# Patient Record
Sex: MALE | Race: WHITE | NOT HISPANIC OR LATINO | URBAN - METROPOLITAN AREA
[De-identification: names, ages, dates, MRNs, and addresses within clinical notes are randomized per-mention and may not be internally consistent; named-entity substitution may affect disease eponyms.]

---

## 2018-04-04 RX ORDER — ALBUTEROL SULFATE 90 UG/1
2 INHALANT RESPIRATORY (INHALATION) EVERY 6 HOURS PRN
Qty: 1 INHALER | Refills: 1 | Status: SHIPPED | OUTPATIENT
Start: 2018-04-04 | End: 2018-05-04 | Stop reason: HOSPADM

## 2018-04-19 ENCOUNTER — OFFICE VISIT (OUTPATIENT)
Dept: INTERNAL MEDICINE | Facility: CLINIC | Age: 58
End: 2018-04-19
Payer: COMMERCIAL

## 2018-04-19 VITALS
HEART RATE: 107 BPM | HEIGHT: 70 IN | WEIGHT: 250 LBS | RESPIRATION RATE: 20 BRPM | BODY MASS INDEX: 35.79 KG/M2 | OXYGEN SATURATION: 98 % | DIASTOLIC BLOOD PRESSURE: 72 MMHG | SYSTOLIC BLOOD PRESSURE: 110 MMHG | TEMPERATURE: 98.2 F

## 2018-04-19 DIAGNOSIS — Z01.818 PRE-OPERATIVE CLEARANCE: ICD-10-CM

## 2018-04-19 DIAGNOSIS — Z12.9 CANCER SCREENING: ICD-10-CM

## 2018-04-19 DIAGNOSIS — Z01.818 PREOPERATIVE CLEARANCE: Primary | ICD-10-CM

## 2018-04-19 DIAGNOSIS — Z01.810 PRE-OPERATIVE CARDIOVASCULAR EXAMINATION, HIGH RISK SURGERY: ICD-10-CM

## 2018-04-19 DIAGNOSIS — I10 ESSENTIAL HYPERTENSION: ICD-10-CM

## 2018-04-19 PROCEDURE — 99214 OFFICE O/P EST MOD 30 MIN: CPT | Performed by: INTERNAL MEDICINE

## 2018-04-19 RX ORDER — TRIAMTERENE/HYDROCHLOROTHIAZID 37.5-25 MG
TABLET ORAL
COMMUNITY
Start: 2018-01-04 | End: 2019-06-25 | Stop reason: SDUPTHER

## 2018-04-19 RX ORDER — OXYCODONE AND ACETAMINOPHEN 5; 325 MG/1; MG/1
1 TABLET ORAL EVERY 4 HOURS PRN
Qty: 20 TABLET | Refills: 0 | Status: SHIPPED | OUTPATIENT
Start: 2018-04-19 | End: 2018-04-29

## 2018-04-19 RX ORDER — ALBUTEROL SULFATE 90 UG/1
INHALANT RESPIRATORY (INHALATION)
COMMUNITY
Start: 2017-12-27 | End: 2019-01-17 | Stop reason: SDUPTHER

## 2018-04-19 RX ORDER — LISINOPRIL 20 MG/1
20 TABLET ORAL
COMMUNITY
Start: 2018-01-04 | End: 2018-12-16 | Stop reason: SDUPTHER

## 2018-04-19 NOTE — PROGRESS NOTES
Patient ID: Christian Stevenson is a 57 y.o. male.      Problem List Items Addressed This Visit        Other    HTN (hypertension)     The patient is stable and would continue with the same plan of care.           Relevant Medications    lisinopril (PRINIVIL) 20 mg tablet    triamterene-hydrochlorothiazide (MAXZIDE-25) 37.5-25 mg per tablet    Preoperative clearance - Primary     Patient is an acceptable risk for surgery, will review preoperative information as possible.  Please call if any questions or concerns           Cancer screening     Colonoscopy revealed congested mucosa 80 cm from the anus.  He saw Dr. Jorge Hahn who recommended follow-up in a couple of years he denies any symptoms or GI problems at this time.    PSA in January 2018 was 1.7.  He denies any symptoms referable to prostate issues             Other Visit Diagnoses     Pre-operative cardiovascular examination, high risk surgery        Relevant Medications    lisinopril (PRINIVIL) 20 mg tablet    Pre-operative clearance        Relevant Orders    CBC and differential (Completed)    Basic metabolic panel (Completed)    PTT (Completed)          Patient Active Problem List   Diagnosis   • Allergic asthma   • HTN (hypertension)   • Preoperative clearance   • Cancer screening       Patient's Medications   New Prescriptions    OXYCODONE-ACETAMINOPHEN (PERCOCET) 5-325 MG PER TABLET    Take 1 tablet by mouth every 4 (four) hours as needed for moderate pain for up to 10 days.   Previous Medications    ALBUTEROL HFA (PROAIR HFA) 90 MCG/ACTUATION INHALER    inhale 2 puff by inhalation route  every 4 - 6 hours as needed    ALBUTEROL HFA (VENTOLIN HFA) 90 MCG/ACTUATION INHALER    Inhale 2 puffs every 6 (six) hours as needed for wheezing.    LISINOPRIL (PRINIVIL) 20 MG TABLET    20 mg.    TRIAMTERENE-HYDROCHLOROTHIAZIDE (MAXZIDE-25) 37.5-25 MG PER TABLET    take 1 tablet by oral route  every day   Modified Medications    No medications on file   Discontinued  "Medications    No medications on file     New Prescriptions    OXYCODONE-ACETAMINOPHEN (PERCOCET) 5-325 MG PER TABLET    Take 1 tablet by mouth every 4 (four) hours as needed for moderate pain for up to 10 days.       Allergies   Allergen Reactions   • No Known Allergies        Social History   Substance Use Topics   • Smoking status: Not on file   • Smokeless tobacco: Not on file   • Alcohol use Not on file       No family history on file.    Subjective   The patient presents the office today for follow-up regarding his medical issues.  He will be having surgery for his right ankle fracture with Dr. Cruz Peters.    No bleeding issues, fever, chest pain, abdominal pain or diarrhea.  He will be having preadmission testing at [facility].    We will review the studies that he has done here in the office.    HPI  I reviewed his  medications, recent labs, and correspondence.     Review of Systems   Constitutional: Negative.    HENT: Negative.    Eyes: Negative.    Respiratory: Negative.    Cardiovascular: Negative.    Gastrointestinal: Negative.    Endocrine: Negative.    Genitourinary: Negative.    Musculoskeletal: Negative.         He has a fracture to right lower leg   Allergic/Immunologic: Negative.    Neurological: Negative.    Psychiatric/Behavioral: Negative.        /72 (BP Location: Left upper arm, Patient Position: Sitting)   Pulse (!) 107   Temp 36.8 °C (98.2 °F)   Resp 20   Ht 1.778 m (5' 10\")   Wt 113 kg (250 lb)   SpO2 98%   BMI 35.87 kg/m²       Physical Exam   Constitutional: He is oriented to person, place, and time. He appears well-developed and well-nourished.   HENT:   Head: Normocephalic and atraumatic.   Right Ear: External ear normal.   Left Ear: External ear normal.   Nose: Nose normal.   Mouth/Throat: Oropharynx is clear and moist.   Eyes: Conjunctivae and EOM are normal. Pupils are equal, round, and reactive to light.   Neck: Normal range of motion. No thyromegaly " present.   Cardiovascular: Normal rate, regular rhythm, normal heart sounds and intact distal pulses.    Pulmonary/Chest: Effort normal and breath sounds normal.   Abdominal: Soft. Bowel sounds are normal.   Musculoskeletal: Normal range of motion.   Fracture right lower extremity with cast   Lymphadenopathy:     He has no cervical adenopathy.   Neurological: He is alert and oriented to person, place, and time.   Skin: Skin is warm and dry.   Psychiatric: He has a normal mood and affect.

## 2018-04-20 LAB
APTT PPP: 32 SEC (ref 24–33)
BASOPHILS # BLD AUTO: 0 X10E3/UL (ref 0–0.2)
BASOPHILS NFR BLD AUTO: 0 %
BUN SERPL-MCNC: 16 MG/DL (ref 6–24)
BUN/CREAT SERPL: 13 (ref 9–20)
CALCIUM SERPL-MCNC: 9.6 MG/DL (ref 8.7–10.2)
CHLORIDE SERPL-SCNC: 93 MMOL/L (ref 96–106)
CO2 SERPL-SCNC: 24 MMOL/L (ref 18–29)
CREAT SERPL-MCNC: 1.24 MG/DL (ref 0.76–1.27)
EOSINOPHIL # BLD AUTO: 0.1 X10E3/UL (ref 0–0.4)
EOSINOPHIL NFR BLD AUTO: 1 %
ERYTHROCYTE [DISTWIDTH] IN BLOOD BY AUTOMATED COUNT: 14.6 % (ref 12.3–15.4)
GFR SERPLBLD CREATININE-BSD FMLA CKD-EPI: 64 ML/MIN/1.73
GFR SERPLBLD CREATININE-BSD FMLA CKD-EPI: 74 ML/MIN/1.73
GLUCOSE SERPL-MCNC: 101 MG/DL (ref 65–99)
HCT VFR BLD AUTO: 45 % (ref 37.5–51)
HGB BLD-MCNC: 15.9 G/DL (ref 13–17.7)
IMM GRANULOCYTES # BLD: 0 X10E3/UL (ref 0–0.1)
IMM GRANULOCYTES NFR BLD: 0 %
LYMPHOCYTES # BLD AUTO: 1.7 X10E3/UL (ref 0.7–3.1)
LYMPHOCYTES NFR BLD AUTO: 14 %
MCH RBC QN AUTO: 31.4 PG (ref 26.6–33)
MCHC RBC AUTO-ENTMCNC: 35.3 G/DL (ref 31.5–35.7)
MCV RBC AUTO: 89 FL (ref 79–97)
MONOCYTES # BLD AUTO: 1.5 X10E3/UL (ref 0.1–0.9)
MONOCYTES NFR BLD AUTO: 12 %
MORPHOLOGY BLD-IMP: (no result)
NEUTROPHILS # BLD AUTO: 8.9 X10E3/UL (ref 1.4–7)
NEUTROPHILS NFR BLD AUTO: 73 %
PLATELET # BLD AUTO: 281 X10E3/UL (ref 150–379)
POTASSIUM SERPL-SCNC: 4.3 MMOL/L (ref 3.5–5.2)
RBC # BLD AUTO: 5.06 X10E6/UL (ref 4.14–5.8)
SODIUM SERPL-SCNC: 135 MMOL/L (ref 134–144)
WBC # BLD AUTO: 12.3 X10E3/UL (ref 3.4–10.8)

## 2018-04-22 PROBLEM — Z01.818 PREOPERATIVE CLEARANCE: Status: ACTIVE | Noted: 2018-04-22

## 2018-04-22 PROBLEM — Z12.9 CANCER SCREENING: Status: ACTIVE | Noted: 2018-04-22

## 2018-04-22 ASSESSMENT — ENCOUNTER SYMPTOMS
CARDIOVASCULAR NEGATIVE: 1
PSYCHIATRIC NEGATIVE: 1
NEUROLOGICAL NEGATIVE: 1
EYES NEGATIVE: 1
RESPIRATORY NEGATIVE: 1
ENDOCRINE NEGATIVE: 1
ALLERGIC/IMMUNOLOGIC NEGATIVE: 1
GASTROINTESTINAL NEGATIVE: 1
CONSTITUTIONAL NEGATIVE: 1
MUSCULOSKELETAL NEGATIVE: 1

## 2018-04-22 NOTE — ASSESSMENT & PLAN NOTE
Colonoscopy revealed congested mucosa 80 cm from the anus.  He saw Dr. Jorge Hahn who recommended follow-up in a couple of years he denies any symptoms or GI problems at this time.    PSA in January 2018 was 1.7.  He denies any symptoms referable to prostate issues

## 2018-04-22 NOTE — ASSESSMENT & PLAN NOTE
Patient is an acceptable risk for surgery, will review preoperative information as possible.  Please call if any questions or concerns

## 2018-04-27 ENCOUNTER — TELEPHONE (OUTPATIENT)
Dept: INTERNAL MEDICINE | Facility: CLINIC | Age: 58
End: 2018-04-27

## 2018-04-27 NOTE — TELEPHONE ENCOUNTER
Spoke to patient's wife gave message concerning lab results. She also stated patient already had his surgery.

## 2019-01-17 ENCOUNTER — OFFICE VISIT (OUTPATIENT)
Dept: INTERNAL MEDICINE | Facility: CLINIC | Age: 59
End: 2019-01-17
Payer: COMMERCIAL

## 2019-01-17 VITALS
OXYGEN SATURATION: 98 % | HEIGHT: 70 IN | RESPIRATION RATE: 14 BRPM | BODY MASS INDEX: 36.22 KG/M2 | WEIGHT: 253 LBS | TEMPERATURE: 98.1 F | HEART RATE: 75 BPM | DIASTOLIC BLOOD PRESSURE: 82 MMHG | SYSTOLIC BLOOD PRESSURE: 134 MMHG

## 2019-01-17 DIAGNOSIS — J40 BRONCHITIS: Primary | ICD-10-CM

## 2019-01-17 DIAGNOSIS — J01.00 ACUTE NON-RECURRENT MAXILLARY SINUSITIS: ICD-10-CM

## 2019-01-17 PROCEDURE — 99214 OFFICE O/P EST MOD 30 MIN: CPT | Performed by: INTERNAL MEDICINE

## 2019-01-17 RX ORDER — PROMETHAZINE HYDROCHLORIDE AND CODEINE PHOSPHATE 6.25; 1 MG/5ML; MG/5ML
5 SOLUTION ORAL EVERY 6 HOURS PRN
Qty: 240 ML | Refills: 0 | Status: SHIPPED | OUTPATIENT
Start: 2019-01-17 | End: 2019-01-27

## 2019-01-17 RX ORDER — ALBUTEROL SULFATE 90 UG/1
INHALANT RESPIRATORY (INHALATION)
Qty: 1 INHALER | Refills: 2 | Status: SHIPPED | OUTPATIENT
Start: 2019-01-17 | End: 2019-05-20 | Stop reason: SDUPTHER

## 2019-01-17 RX ORDER — BENZONATATE 200 MG/1
200 CAPSULE ORAL 3 TIMES DAILY PRN
Qty: 60 CAPSULE | Refills: 0 | Status: SHIPPED | OUTPATIENT
Start: 2019-01-17 | End: 2019-02-16

## 2019-01-17 RX ORDER — AMOXICILLIN AND CLAVULANATE POTASSIUM 875; 125 MG/1; MG/1
1 TABLET, FILM COATED ORAL 2 TIMES DAILY
Qty: 14 TABLET | Refills: 0 | Status: SHIPPED | OUTPATIENT
Start: 2019-01-17 | End: 2019-01-24

## 2019-01-17 NOTE — PATIENT INSTRUCTIONS
Albuterol inhaler every 4 to 6 hrs as needed for wheezing/ cough/ SOB  Will send in cough suppressant to use up to 3x/ day for cough  Promethazine w/ codiene for night time cough  augmentin to cover sinuses as well 2x/ day for 7 days  Continue w/ rest, fluids, vit C or airborne to help boost immune system

## 2019-01-17 NOTE — PROGRESS NOTES
"  Daily Progress Note      Subjective      Patient ID: Christian Stevenson is a 58 y.o. male.    Ongoing sx for about 3 wks.   Has tried mucinex, robitussin, etc w/o improvement  Can't lay flat tt night due to coughing  Productive for clear to yellow mucous  +sinus pressure and congestion  Hearing is down, ears w/ pressure  No f/c/s/r  No sore throat  +wheezing, +SOB w/ exertion, josselyn first thing in am        Vitals:    01/17/19 1316   BP: 134/82   BP Location: Right upper arm   Patient Position: Sitting   Pulse: 75   Resp: 14   Temp: 36.7 °C (98.1 °F)   TempSrc: Oral   SpO2: 98%   Weight: 115 kg (253 lb)   Height: 1.778 m (5' 10\")       The following have been reviewed and updated as appropriate in this visit:       Review of Systems            Past Medical History:   Diagnosis Date   • Asthma    • Broken leg     right    • Hypertension      History reviewed. No pertinent surgical history.  Social History     Social History   • Marital status:      Spouse name: N/A   • Number of children: N/A   • Years of education: N/A     Occupational History   • Not on file.     Social History Main Topics   • Smoking status: Never Smoker   • Smokeless tobacco: Never Used   • Alcohol use Yes      Comment: occ   • Drug use: No   • Sexual activity: Not on file     Other Topics Concern   • Not on file     Social History Narrative   • No narrative on file     History reviewed. No pertinent family history.  Allergies   Allergen Reactions   • No Known Allergies      Current Outpatient Prescriptions   Medication Sig Dispense Refill   • albuterol HFA (PROAIR HFA) 90 mcg/actuation inhaler 2 puffs every 4-6 hrs as needed for SOB, cough or wheezing 1 Inhaler 2   • lisinopril (PRINIVIL) 20 mg tablet TAKE 1 TABLET BY MOUTH  EVERY DAY 30 tablet 0   • triamterene-hydrochlorothiazide (MAXZIDE-25) 37.5-25 mg per tablet take 1 tablet by oral route  every day     • amoxicillin-pot clavulanate (AUGMENTIN) 875-125 mg per tablet Take 1 tablet by mouth " 2 (two) times a day for 7 days. 14 tablet 0   • benzonatate (TESSALON) 200 mg capsule Take 1 capsule (200 mg total) by mouth 3 (three) times a day as needed for cough. 60 capsule 0   • promethazine-codeine (PHENERGAN with CODEINE) 6.25-10 mg/5 mL syrup Take 5 mL by mouth every 6 (six) hours as needed for cough for up to 10 days. 240 mL 0     No current facility-administered medications for this visit.              Physical Exam   Constitutional: He appears well-nourished. No distress.   HENT:   Head: Normocephalic and atraumatic.   Right Ear: External ear normal.   Left Ear: External ear normal.   Nose: Nose normal.   Mouth/Throat: Oropharynx is clear and moist.   +b/l retracted TMs  +thick yellow PND   Eyes: Conjunctivae are normal.   Neck: Normal range of motion. Neck supple.   Cardiovascular: Normal rate and regular rhythm.    Pulmonary/Chest: Effort normal. No respiratory distress. He has wheezes. He has no rales.   Insp/ exp wheezing t.o.  No egophony  Nl percussion   Lymphadenopathy:     He has no cervical adenopathy.   Skin: Skin is warm and dry. He is not diaphoretic.   Psychiatric: He has a normal mood and affect.   Vitals reviewed.      Assessment/Plan   Problem List Items Addressed This Visit     Bronchitis - Primary     Albuterol inhaler every 4 to 6 hrs as needed for wheezing/ cough/ SOB  Will send in cough suppressant to use up to 3x/ day for cough  Promethazine w/ codiene for night time cough  augmentin to cover sinuses as well 2x/ day for 7 days  Continue w/ rest, fluids, vit C or airborne to help boost immune system         Relevant Medications    benzonatate (TESSALON) 200 mg capsule    promethazine-codeine (PHENERGAN with CODEINE) 6.25-10 mg/5 mL syrup    albuterol HFA (PROAIR HFA) 90 mcg/actuation inhaler    Acute non-recurrent maxillary sinusitis          No Follow-up on file.          Tifafny Mcgee,   1/17/2019

## 2019-03-01 ENCOUNTER — TELEPHONE (OUTPATIENT)
Dept: CARDIOLOGY | Facility: CLINIC | Age: 59
End: 2019-03-01

## 2019-03-01 RX ORDER — LISINOPRIL 20 MG/1
20 TABLET ORAL
Qty: 30 TABLET | Refills: 0 | Status: SHIPPED | OUTPATIENT
Start: 2019-03-01 | End: 2019-04-10 | Stop reason: SDUPTHER

## 2019-04-18 RX ORDER — LISINOPRIL 20 MG/1
TABLET ORAL
Qty: 30 TABLET | Refills: 0 | Status: SHIPPED | OUTPATIENT
Start: 2019-04-18 | End: 2019-05-17 | Stop reason: SDUPTHER

## 2019-05-20 RX ORDER — LISINOPRIL 20 MG/1
TABLET ORAL
Qty: 30 TABLET | Refills: 0 | Status: SHIPPED | OUTPATIENT
Start: 2019-05-20 | End: 2019-06-24 | Stop reason: SDUPTHER

## 2019-05-20 RX ORDER — ALBUTEROL SULFATE 90 UG/1
INHALANT RESPIRATORY (INHALATION)
Qty: 1 INHALER | Refills: 2 | Status: SHIPPED | OUTPATIENT
Start: 2019-05-20 | End: 2020-06-16 | Stop reason: SDUPTHER

## 2019-06-24 RX ORDER — LISINOPRIL 20 MG/1
TABLET ORAL
Qty: 30 TABLET | Refills: 0 | Status: SHIPPED | OUTPATIENT
Start: 2019-06-24 | End: 2019-07-08 | Stop reason: SDUPTHER

## 2019-06-25 RX ORDER — TRIAMTERENE/HYDROCHLOROTHIAZID 37.5-25 MG
1 TABLET ORAL EVERY MORNING
Qty: 90 TABLET | Refills: 0 | Status: SHIPPED | OUTPATIENT
Start: 2019-06-25 | End: 2019-09-04 | Stop reason: SDUPTHER

## 2019-06-25 NOTE — TELEPHONE ENCOUNTER
LOV was on 1/17/19.  Patient does not have an upcoming appointment scheduled.    Script was sent to the pharmacy.

## 2019-07-09 RX ORDER — LISINOPRIL 20 MG/1
TABLET ORAL
Qty: 30 TABLET | Refills: 2 | Status: SHIPPED | OUTPATIENT
Start: 2019-07-09 | End: 2019-09-15 | Stop reason: SDUPTHER

## 2019-09-05 RX ORDER — TRIAMTERENE/HYDROCHLOROTHIAZID 37.5-25 MG
1 TABLET ORAL EVERY MORNING
Qty: 90 TABLET | Refills: 1 | Status: SHIPPED | OUTPATIENT
Start: 2019-09-05 | End: 2020-01-23

## 2019-09-18 RX ORDER — LISINOPRIL 20 MG/1
TABLET ORAL
Qty: 90 TABLET | Refills: 0 | Status: SHIPPED | OUTPATIENT
Start: 2019-09-18 | End: 2019-12-09 | Stop reason: SDUPTHER

## 2019-12-10 RX ORDER — LISINOPRIL 20 MG/1
TABLET ORAL
Qty: 90 TABLET | Refills: 0 | Status: SHIPPED | OUTPATIENT
Start: 2019-12-10 | End: 2020-06-16 | Stop reason: SDUPTHER

## 2020-06-15 RX ORDER — LISINOPRIL 20 MG/1
20 TABLET ORAL
Qty: 90 TABLET | Refills: 0 | Status: CANCELLED | OUTPATIENT
Start: 2020-06-15

## 2020-06-15 NOTE — TELEPHONE ENCOUNTER
Please call to find out if they would like to do a telemedicine appointment or if they would like to establish care closer to home?  Thank you, Tiffany

## 2020-06-15 NOTE — TELEPHONE ENCOUNTER
pls call patient;s wife.. They have moved [facility] and would like to talk to Dr. Mcgee.

## 2020-06-16 ENCOUNTER — TELEMEDICINE (OUTPATIENT)
Dept: INTERNAL MEDICINE | Facility: CLINIC | Age: 60
End: 2020-06-16
Payer: COMMERCIAL

## 2020-06-16 DIAGNOSIS — Z12.5 PROSTATE CANCER SCREENING: ICD-10-CM

## 2020-06-16 DIAGNOSIS — Z12.11 COLON CANCER SCREENING: ICD-10-CM

## 2020-06-16 DIAGNOSIS — J45.20 MILD INTERMITTENT EXTRINSIC ASTHMA WITHOUT COMPLICATION: ICD-10-CM

## 2020-06-16 DIAGNOSIS — Z13.220 LIPID SCREENING: ICD-10-CM

## 2020-06-16 DIAGNOSIS — I10 ESSENTIAL HYPERTENSION: Primary | ICD-10-CM

## 2020-06-16 PROBLEM — J01.00 ACUTE NON-RECURRENT MAXILLARY SINUSITIS: Status: RESOLVED | Noted: 2019-01-17 | Resolved: 2020-06-16

## 2020-06-16 PROBLEM — J40 BRONCHITIS: Status: RESOLVED | Noted: 2019-01-17 | Resolved: 2020-06-16

## 2020-06-16 PROBLEM — Z12.9 CANCER SCREENING: Status: RESOLVED | Noted: 2018-04-22 | Resolved: 2020-06-16

## 2020-06-16 PROCEDURE — 99213 OFFICE O/P EST LOW 20 MIN: CPT | Mod: 95 | Performed by: INTERNAL MEDICINE

## 2020-06-16 RX ORDER — TRIAMTERENE/HYDROCHLOROTHIAZID 37.5-25 MG
1 TABLET ORAL EVERY MORNING
Qty: 90 TABLET | Refills: 0 | Status: SHIPPED | OUTPATIENT
Start: 2020-06-16 | End: 2020-09-08

## 2020-06-16 RX ORDER — ALBUTEROL SULFATE 90 UG/1
INHALANT RESPIRATORY (INHALATION)
Qty: 1 INHALER | Refills: 2 | Status: SHIPPED | OUTPATIENT
Start: 2020-06-16

## 2020-06-16 RX ORDER — LISINOPRIL 20 MG/1
20 TABLET ORAL
Qty: 90 TABLET | Refills: 0 | Status: SHIPPED | OUTPATIENT
Start: 2020-06-16 | End: 2020-09-08

## 2020-06-16 NOTE — PROGRESS NOTES
Verification of Patient Location:  The patient affirms they are currently located in the following state: [place]    Request for Consent:   Audio Only Encounter   You and I are about to have a telemedicine check-in or visit. This is allowed because you have requested it. This telemedicine visit will be billed to your health insurance or you, if you are self-insured. You understand you will be responsible for any copayments or coinsurances that apply to your telemedicine visit. Before starting our telemedicine visit, I am required to get your consent for this virtual check-in or visit by telemedicine. Do you consent?    Patient Response to Request for Consent:  Yes      Visit Documentation:  Subjective     Patient ID: Christian Stevenson is a 60 y.o. male.  1960      Audio call to discuss chronic prbs    HTN- uncontrolled  Out of BP med for about a month, not sure if it is the lisinopril or the triampterine  No CP, palp, heaviness or pressure  No labs in over 2 years  Reviewed the importance of labs in light of medications, pressure, etc  Recently moved to [facility] and will need PCP up there.   House sold much more quickly than they planned    Asthma- stable, no recent flares  Using albuterol prn, not daily  Not waking at night wheezing    Over all feeling well, no HA, dizziness, vis changes  No f/c/s/r  No SOB, cough, wheezing, etc      The following have been reviewed and updated as appropriate in this visit:  Tobacco  Allergies  Meds  Problems  Med Hx  Surg Hx  Fam Hx  Soc Hx        Review of Systems      Assessment/Plan   Diagnoses and all orders for this visit:    Essential hypertension (Primary)  -     Comprehensive metabolic panel; Future    Mild intermittent extrinsic asthma without complication  -     CBC and Differential; Future    Lipid screening  -     Lipid panel; Future    Prostate cancer screening  -     PSA; Future    Colon cancer screening  -     FIT; Future    Other orders  -      albuterol HFA (PROAIR HFA) 90 mcg/actuation inhaler; 2 puffs every 4-6 hrs as needed for SOB, cough or wheezing  -     triamterene-hydrochlorothiazide (MAXZIDE-25) 37.5-25 mg per tablet; Take 1 tablet by mouth every morning.  -     lisinopriL (PRINIVIL) 20 mg tablet; Take 1 tablet (20 mg total) by mouth once daily.    1. Essential hypertension  Med sent in for 90 days. Reviewed no refills w/o office visit and labs  Reviewed need for PCP in the area    - Comprehensive metabolic panel; Future  - Comprehensive metabolic panel    2. Mild intermittent extrinsic asthma without complication  Well controlled, con't prn albuterol    - CBC and Differential; Future  - CBC and Differential      Time Spent in Medical Discussion During This Encounter:      14 minutes

## 2020-06-18 RX ORDER — TRIAMTERENE/HYDROCHLOROTHIAZID 37.5-25 MG
1 TABLET ORAL EVERY MORNING
Qty: 90 TABLET | Refills: 0 | OUTPATIENT
Start: 2020-06-18

## 2020-07-10 PROBLEM — Z01.818 PREOPERATIVE CLEARANCE: Status: RESOLVED | Noted: 2018-04-22 | Resolved: 2020-07-10

## 2020-07-31 ENCOUNTER — TELEPHONE (OUTPATIENT)
Dept: INTERNAL MEDICINE | Facility: CLINIC | Age: 60
End: 2020-07-31

## 2020-07-31 NOTE — TELEPHONE ENCOUNTER
Incoming call from pt   He had a tele med 6/16/20 he was ordered labs /fit   Please mail out to pt . Thank you

## 2020-08-15 LAB
ALBUMIN SERPL-MCNC: 4.4 G/DL (ref 3.6–5.1)
ALBUMIN/GLOB SERPL: 1.4 (CALC) (ref 1–2.5)
ALP SERPL-CCNC: 49 U/L (ref 35–144)
ALT SERPL-CCNC: 34 U/L (ref 9–46)
AST SERPL-CCNC: 22 U/L (ref 10–35)
BASOPHILS # BLD AUTO: 41 CELLS/UL (ref 0–200)
BASOPHILS NFR BLD AUTO: 0.6 %
BILIRUB SERPL-MCNC: 1.4 MG/DL (ref 0.2–1.2)
BUN SERPL-MCNC: 20 MG/DL (ref 7–25)
BUN/CREAT SERPL: ABNORMAL (CALC) (ref 6–22)
CALCIUM SERPL-MCNC: 9.2 MG/DL (ref 8.6–10.3)
CHLORIDE SERPL-SCNC: 99 MMOL/L (ref 98–110)
CHOLEST SERPL-MCNC: 159 MG/DL
CHOLEST/HDLC SERPL: 4.3 (CALC)
CO2 SERPL-SCNC: 28 MMOL/L (ref 20–32)
CREAT SERPL-MCNC: 0.99 MG/DL (ref 0.7–1.25)
EOSINOPHIL # BLD AUTO: 228 CELLS/UL (ref 15–500)
EOSINOPHIL NFR BLD AUTO: 3.3 %
ERYTHROCYTE [DISTWIDTH] IN BLOOD BY AUTOMATED COUNT: 12.7 % (ref 11–15)
GLOBULIN SER CALC-MCNC: 3.2 G/DL (CALC) (ref 1.9–3.7)
GLUCOSE SERPL-MCNC: 132 MG/DL (ref 65–99)
HCT VFR BLD AUTO: 47.6 % (ref 38.5–50)
HDLC SERPL-MCNC: 37 MG/DL
HGB BLD-MCNC: 16.1 G/DL (ref 13.2–17.1)
LDLC SERPL CALC-MCNC: 105 MG/DL (CALC)
LYMPHOCYTES # BLD AUTO: 1132 CELLS/UL (ref 850–3900)
LYMPHOCYTES NFR BLD AUTO: 16.4 %
MCH RBC QN AUTO: 30.9 PG (ref 27–33)
MCHC RBC AUTO-ENTMCNC: 33.8 G/DL (ref 32–36)
MCV RBC AUTO: 91.4 FL (ref 80–100)
MONOCYTES # BLD AUTO: 883 CELLS/UL (ref 200–950)
MONOCYTES NFR BLD AUTO: 12.8 %
NEUTROPHILS # BLD AUTO: 4616 CELLS/UL (ref 1500–7800)
NEUTROPHILS NFR BLD AUTO: 66.9 %
NONHDLC SERPL-MCNC: 122 MG/DL (CALC)
PLATELET # BLD AUTO: 294 THOUSAND/UL (ref 140–400)
PMV BLD REES-ECKER: 9.9 FL (ref 7.5–12.5)
POTASSIUM SERPL-SCNC: 4.4 MMOL/L (ref 3.5–5.3)
PROT SERPL-MCNC: 7.6 G/DL (ref 6.1–8.1)
PSA SERPL-MCNC: 1.6 NG/ML
QUEST EGFR NON-AFR. AMERICAN: 82 ML/MIN/1.73M2
RBC # BLD AUTO: 5.21 MILLION/UL (ref 4.2–5.8)
SODIUM SERPL-SCNC: 134 MMOL/L (ref 135–146)
TRIGL SERPL-MCNC: 84 MG/DL
WBC # BLD AUTO: 6.9 THOUSAND/UL (ref 3.8–10.8)

## 2020-08-18 LAB — HEMOCCULT STL QL IA: NORMAL

## 2020-08-19 DIAGNOSIS — R73.01 IFG (IMPAIRED FASTING GLUCOSE): Primary | ICD-10-CM

## 2020-08-19 NOTE — RESULT ENCOUNTER NOTE
Please notify patient that his blood work showed good blood counts, liver and kidney function.  His PSA showed no signs of prostate cancer.  His stool cards were negative for blood, good news, and should be repeated next year if he does not choose to have a colonoscopy.  His cholesterol levels were in good range.  His fasting sugar was elevated at 132.  We should check a follow-up lab to make sure he is not developing diabetes.  I placed an order for that.  Thank you, Tiffany

## 2020-08-20 ENCOUNTER — TELEPHONE (OUTPATIENT)
Dept: INTERNAL MEDICINE | Facility: CLINIC | Age: 60
End: 2020-08-20

## 2020-08-20 NOTE — TELEPHONE ENCOUNTER
----- Message from Tiffany Mcgee DO sent at 8/19/2020  5:54 PM EDT -----  Please notify patient that his blood work showed good blood counts, liver and kidney function.  His PSA showed no signs of prostate cancer.  His stool cards were negative for blood, good news, and should be repeated next year if he does not choose to have a colonoscopy.  His cholesterol levels were in good range.  His fasting sugar was elevated at 132.  We should check a follow-up lab to make sure he is not developing diabetes.  I placed an order for that.  Thank you, Tiffany

## 2020-09-08 RX ORDER — LISINOPRIL 20 MG/1
TABLET ORAL
Qty: 90 TABLET | Refills: 0 | Status: SHIPPED | OUTPATIENT
Start: 2020-09-08 | End: 2020-09-21

## 2020-09-08 RX ORDER — TRIAMTERENE/HYDROCHLOROTHIAZID 37.5-25 MG
TABLET ORAL
Qty: 90 TABLET | Refills: 0 | Status: SHIPPED | OUTPATIENT
Start: 2020-09-08 | End: 2020-09-21

## 2024-08-12 ENCOUNTER — APPOINTMENT (OUTPATIENT)
Dept: URGENT CARE | Facility: CLINIC | Age: 64
End: 2024-08-12